# Patient Record
(demographics unavailable — no encounter records)

---

## 2025-02-03 NOTE — REASON FOR VISIT
[Initial Consultation] : an initial consultation for [FreeTextEntry2] : ear clogged and nose check up

## 2025-02-03 NOTE — ASSESSMENT
[FreeTextEntry1] : CI AU    WNL w type A AU  has mupirocin ointment, will start it warm soaks  f/u prn

## 2025-02-03 NOTE — REVIEW OF SYSTEMS
[Sneezing] : sneezing [Seasonal Allergies] : seasonal allergies [Post Nasal Drip] : post nasal drip [Hearing Loss] : hearing loss [Recurrent Sinus Infections] : recurrent sinus infections [Sinus Pain] : sinus pain [Sinus Pressure] : sinus pressure [Dry Eyes] : dry eyes [Heartburn] : heartburn [Joint Pain] : joint pain [Negative] : Heme/Lymph [FreeTextEntry3] : watery  [FreeTextEntry7] : reflux  [de-identified] : headache

## 2025-02-03 NOTE — PHYSICAL EXAM
[Normal] : mucosa is normal [Midline] : trachea located in midline position [de-identified] : CI AU

## 2025-02-03 NOTE — HISTORY OF PRESENT ILLNESS
[de-identified] : 46 yr old female was told she has wax +Qtips c/o hearing loss -tinnitus, dizzy +childhood otitis +FH father presby -head trauma, noise exp  +intracranial ICA aneurysm followed with MRA  c/o a sore left nostril that comes and goes, better now denies personal groomer +crusting, bleeding